# Patient Record
Sex: MALE | Race: WHITE | URBAN - METROPOLITAN AREA
[De-identification: names, ages, dates, MRNs, and addresses within clinical notes are randomized per-mention and may not be internally consistent; named-entity substitution may affect disease eponyms.]

---

## 2017-10-31 ENCOUNTER — OFFICE VISIT (OUTPATIENT)
Dept: INTERNAL MEDICINE CLINIC | Age: 22
End: 2017-10-31

## 2017-10-31 VITALS
WEIGHT: 178 LBS | SYSTOLIC BLOOD PRESSURE: 110 MMHG | HEIGHT: 71 IN | BODY MASS INDEX: 24.92 KG/M2 | DIASTOLIC BLOOD PRESSURE: 70 MMHG | OXYGEN SATURATION: 99 % | HEART RATE: 67 BPM

## 2017-10-31 DIAGNOSIS — Z00.00 ANNUAL PHYSICAL EXAM: Primary | ICD-10-CM

## 2017-10-31 DIAGNOSIS — G47.00 INSOMNIA, UNSPECIFIED TYPE: ICD-10-CM

## 2017-10-31 DIAGNOSIS — F32.A DEPRESSION, UNSPECIFIED DEPRESSION TYPE: ICD-10-CM

## 2017-10-31 PROCEDURE — G0444 DEPRESSION SCREEN ANNUAL: HCPCS | Performed by: INTERNAL MEDICINE

## 2017-10-31 PROCEDURE — 99385 PREV VISIT NEW AGE 18-39: CPT | Performed by: INTERNAL MEDICINE

## 2017-10-31 ASSESSMENT — PATIENT HEALTH QUESTIONNAIRE - PHQ9
3. TROUBLE FALLING OR STAYING ASLEEP: 3
5. POOR APPETITE OR OVEREATING: 3
4. FEELING TIRED OR HAVING LITTLE ENERGY: 3
1. LITTLE INTEREST OR PLEASURE IN DOING THINGS: 3
6. FEELING BAD ABOUT YOURSELF - OR THAT YOU ARE A FAILURE OR HAVE LET YOURSELF OR YOUR FAMILY DOWN: 0
2. FEELING DOWN, DEPRESSED OR HOPELESS: 3
SUM OF ALL RESPONSES TO PHQ9 QUESTIONS 1 & 2: 6
9. THOUGHTS THAT YOU WOULD BE BETTER OFF DEAD, OR OF HURTING YOURSELF: 1
10. IF YOU CHECKED OFF ANY PROBLEMS, HOW DIFFICULT HAVE THESE PROBLEMS MADE IT FOR YOU TO DO YOUR WORK, TAKE CARE OF THINGS AT HOME, OR GET ALONG WITH OTHER PEOPLE: 3
SUM OF ALL RESPONSES TO PHQ QUESTIONS 1-9: 22
8. MOVING OR SPEAKING SO SLOWLY THAT OTHER PEOPLE COULD HAVE NOTICED. OR THE OPPOSITE, BEING SO FIGETY OR RESTLESS THAT YOU HAVE BEEN MOVING AROUND A LOT MORE THAN USUAL: 3
7. TROUBLE CONCENTRATING ON THINGS, SUCH AS READING THE NEWSPAPER OR WATCHING TELEVISION: 3

## 2017-10-31 NOTE — PROGRESS NOTES
Date    CLAVICLE SURGERY Right     [facility]     Family History   Problem Relation Age of Onset    Other Father      Blood Clot    Asthma Sister      Social History     Social History    Marital status: Single     Spouse name: N/A    Number of children: N/A    Years of education: N/A     Occupational History    Not on file. Social History Main Topics    Smoking status: Never Smoker    Smokeless tobacco: Never Used    Alcohol use Yes      Comment: occassionally    Drug use: No    Sexual activity: Yes     Partners: Female     Birth control/ protection: Condom     Other Topics Concern    Not on file     Social History Narrative    No narrative on file       Review of Systems:  Constitutional: negative  HENT: negative  EYES: negative  Respiratory: negative  Cardiovasular :Negative  Gastrointestinal: negative  Endocrine: negative  Musculoskeletal: negative  Skin: negative  Allergic/Immunological: negative  Hematological: negative  Psychiatric/Behavorial:  Depression  : negative  Renal: negative  CNS: negative    Physical Exam: Blood pressure 110/70, pulse 67, height 5' 11\" (1.803 m), weight 178 lb (80.7 kg), SpO2 99 %. Vitals:    10/31/17 1420   BP: 110/70   Pulse: 67   SpO2: 99%   Weight: 178 lb (80.7 kg)   Height: 5' 11\" (1.803 m)     Body mass index is 24.83 kg/m². Constitutional: He is oriented to person, place, and time. He appears well-developed and well-nourished. No distress. HEENT:   Head: Normocephalic and atraumatic. Right Ear: Tympanic membrane, external ear and ear canal normal.   Left Ear: Tympanic membrane, external ear and ear canal normal.   Nose: Nose normal.   Mouth/Throat: Oropharynx is clear and moist and mucous membranes are normal. No oropharyngeal exudate or posterior oropharyngeal erythema. He has no cervical adenopathy. Eyes: Conjunctivae and extraocular motions are normal. Pupils are equal, round, and reactive to light.    Neck: Full passive range of motion without pain. Neck supple. No JVD present. Carotid bruit is not present. No mass and no thyromegaly present. Cardiovascular: Normal rate, regular rhythm, normal heart sounds and intact distal pulses. Exam reveals no gallop and no friction rub. PARI heard. Pulmonary/Chest: Effort normal and breath sounds normal. No respiratory distress. He has no wheezes, rhonchi or rales. Abdominal: Soft, non-tender. Bowel sounds and aorta are normal. There is no organomegaly, mass or bruit. Genitourinary:   Not examined. Musculoskeletal: Normal range of motion, no synovitis. He exhibits no edema. Neurological: He is alert and oriented to person, place, and time. He has normal reflexes. No cranial nerve deficit. Coordination normal.   Skin: Skin is warm, dry and intact. No suspicious lesions are noted. Psychiatric: He has a normal mood and affect. His speech is normal and behavior is normal. Judgment, cognition and memory are normal.     No results found for this or any previous visit. Assessment/Plan:  There is no problem list on file for this patient. Kristina Orrs was seen today for new patient. Diagnoses and all orders for this visit:    Annual physical exam  -     CBC; Future  -     Comprehensive Metabolic Panel; Future  -     Lipid Panel; Future  -     T4, Free; Future  -     TSH without Reflex; Future  -     Vitamin B12; Future  -     Folate; Future: Call for results    Depression, unspecified depression type          Referral to Kita Arroyo for cognitive behavioral therapy   Insomnia, unspecified type    Other orders  -     sertraline (ZOLOFT) 50 MG tablet; Take 1/2 pill a day for 7 days, then 1 pill a day. .Depression medicine. Return follow-up  Call for acute change in condition  Dr. Desmond Amin.

## 2017-11-17 ENCOUNTER — OFFICE VISIT (OUTPATIENT)
Dept: PSYCHOLOGY | Age: 22
End: 2017-11-17

## 2017-11-17 DIAGNOSIS — F33.1 MDD (MAJOR DEPRESSIVE DISORDER), RECURRENT EPISODE, MODERATE (HCC): Primary | ICD-10-CM

## 2017-11-17 PROCEDURE — 90791 PSYCH DIAGNOSTIC EVALUATION: CPT | Performed by: SOCIAL WORKER

## 2017-11-17 ASSESSMENT — PATIENT HEALTH QUESTIONNAIRE - PHQ9
3. TROUBLE FALLING OR STAYING ASLEEP: 3
7. TROUBLE CONCENTRATING ON THINGS, SUCH AS READING THE NEWSPAPER OR WATCHING TELEVISION: 3
6. FEELING BAD ABOUT YOURSELF - OR THAT YOU ARE A FAILURE OR HAVE LET YOURSELF OR YOUR FAMILY DOWN: 3
8. MOVING OR SPEAKING SO SLOWLY THAT OTHER PEOPLE COULD HAVE NOTICED. OR THE OPPOSITE, BEING SO FIGETY OR RESTLESS THAT YOU HAVE BEEN MOVING AROUND A LOT MORE THAN USUAL: 1
SUM OF ALL RESPONSES TO PHQ9 QUESTIONS 1 & 2: 5
4. FEELING TIRED OR HAVING LITTLE ENERGY: 3
2. FEELING DOWN, DEPRESSED OR HOPELESS: 2
10. IF YOU CHECKED OFF ANY PROBLEMS, HOW DIFFICULT HAVE THESE PROBLEMS MADE IT FOR YOU TO DO YOUR WORK, TAKE CARE OF THINGS AT HOME, OR GET ALONG WITH OTHER PEOPLE: 3
SUM OF ALL RESPONSES TO PHQ QUESTIONS 1-9: 21
5. POOR APPETITE OR OVEREATING: 3
1. LITTLE INTEREST OR PLEASURE IN DOING THINGS: 3
9. THOUGHTS THAT YOU WOULD BE BETTER OFF DEAD, OR OF HURTING YOURSELF: 0

## 2017-11-17 NOTE — PATIENT INSTRUCTIONS
1.  Start taking your zoloft in the morning. 2.  Try to minimize alcohol use  3. Let's set a goal of running 5 times week  4. Get back to kicking daily if you can   5. Review the handout on depression, we will build on this  6. Return to see Elisa Bobby in 2 weeks. Depression: Facts, Myths & Tips for Feeling Better        Facts    Depression is very common  Nearly 20% of the [place] population experiences a significant depression during their lifetime. Depression is treatable  Because depression affects so many, and can have such a powerful and negative impact on life, there has been an enormous amount of research conducted on how to reduce symptoms and improve functioning. As a result, we now know there are behaviors YOU can engage in to make yourself feel better. Depression is not a weakness  People suffer from depression for a variety of reasons, biological, environmental and behavioral.  Research indicates that [employer] is NOT one of the reasons people become depressed. Depression is not something you are powerless against  Evidence suggests that you can directly impact the intensity and duration of depression by what you do and by altering the way you think about certain things. The Downward Spiral    Depression often begins as a drop in mood due to an environmental or biological trigger that makes people feel less like being active. Being less active, in turn, often causes people to experience an even lower mood and feel even less like being active, and so the cycle begins. 1.     How can I start feeling better? The first and best way to reverse the downward cycle is to get active! Your body produces its own anti-depressants every time you exercise or do something pleasurable. Regular exercise is one of the very best ways to improve your mood.  In fact some studies have shown that a solid exercise program is as effective as psychotherapy or anti-depressant medication for some people. *See physical activity section of handout    Force yourself to do something you found pleasurable before depression. This may be different for everyone and it doesnt matter if its gardening, playing bridge, walking, reading a novel, or simply talking to a close friend. What matters is that YOU find the activity pleasurable! Even if you dont feel like doing something pleasurable for yourself, DO IT ANYWAY. We call this the fake it until you make it principle. Educate yourself! Often people feel powerless against medical conditions because they do not understand what is happening in their body. Just by reading this handout you know more than most people about depression. Knowledge is power when you can apply it, and make yourself feel better. *See recommended reading list at the bottom of this handout    Begin to notice unhealthy and unhelpful thoughts! In addition to how we behave, how we think influences our mood directly. Notice recurrent or alarming thoughts that have an impact on your mood. Ask yourself is this type of thinking helping me or hurting me?  if your answer is its hurting me here are some things you can do: *see disputation of negative thoughts section of handout  - Challenge the negative thought. Is it truly accurate? Wheres the proof? Become your own  and collect the facts.  - Reframe the negative thought. How can I think differently about this problem, situation, or view of myself? Allow yourself to view a situation from more than one angle, how might my spouse, friend, or someone I admire view this same problem?  - Use the best friend scenario. How would you help your best friend if he or she was having these same thoughts? Would you criticize him or her as harshly as you criticize yourself? Remember, YOU know YOU better than anyone else. You likely know what kinds of activities, thoughts and reinforcement you respond to.   Doing whats easiest and most doable is the key. Pick 1 or 2 things that are easy and get started feeling better TODAY! * Use the following handout sections to guide you through behavioral and thinking exercises to help you manage your depressive symptoms, improve your functioning and to begin living your life well again. Recommended readings on managing depression    - Feeling Good by Dr. Airam Childress. Burns     - Mind over [place]avia[place]Uday by Theresa Schmidt and [facility] [place] by Dr. Weir Chito by Dr. Quenton Schirmer. S[place]ky    Disputation:  Challenging Upsetting Thinking    Examine your thoughts for key words:  1. must, need, got to, have to, should (unrealistic standards)  2. never, always, completely, totally, all everything, everyone (predictions /  labeling)  3. awful, terrible, horrible, unbearable, disaster, worst ever (labeling / predictions)  4. jerk, slob, creep, hypocrite, bully, stupid (labels)  Dispute or question the accuracy of the questionable thoughts. 1. Am I upsetting myself unnecessarily? How can I see this another way? 2. Is my thinking working for or against me? How could I view this in a less upsetting way? 3. What am I demanding must happen? What do I want or prefer, rather than need? 4. Am I making something too terrible? Is it really that awful? What would be so terrible about that? 5. Am I labeling a person? What is the action that I dont like? 6. Whats untrue about my thoughts? How can I stick to the facts? Whats the proof for what I am thinking or believing about this? 7. Am I using extreme, black-and-white language? What less extreme words might be more accurate? 8. Am I fortune telling or mind reading in a way that gets me upset or unhappy? What are the odds (percent chance -- e.g., there is a 5% chance. ..) that it will really turn out the way Im thinking or imagining? 9. What are my options in this situation?   How would I like to respond? 10. Create more moderate, helpful, or realistic statements to replace the upsetting ones. 11. Have I had any experiences that show that this thought might not be totally true? 12. If my best friend or someone I loved had this thought, what would I tell them? 15. If my best friend or someone I loved knew I was thinking this thought, what would they say to me? What evidence would they point out to me that would suggest that my thought is not completely true? 14. Are there strengths in me or positives in the situation that I am ignoring? Am I underestimating my ability to cope with unfortunate circumstances? 15. When I am not feeling this way, do I think about this situation any differently? How?  16. Have I been in this type of situation before? What happened? What have I learned from prior experiences that could help me now? 17. Five years from now, if I look back on this situation, will I look at it any differently? Will I focus on any different part of my experience? 25. Am I blaming myself for something over which I do not have complete control? OVERCOMING DEPRESSION    This booklet is designed to provide information about strategies for overcoming depression. It discusses a model or framework for understanding depression (the depression spiral) and presents an overview of treatment of depression, including use of medication and strategic coping strategies. The booklet is designed to be used with the assistance of your Behavioral Health Consultant. The Depression Spiral    The figure below depicts one helpful way to think about and understand depression. Our life experience (including depression) is influenced by a number of interrelated factors: our environment, biological factors, our thoughts and beliefs, our behaviors, and our emotions. Each factor can affect the others. For example, Michi Eduardo recently began working in a fast-paced, high-pressure job (environmental factor).   She began to have thoughts such as Theres no way I can get all this work done. Its impossible. If I dont get it done, I may lose my job.   As a result, she began to work longer hours, cut out all fun activities, and withdraw from family and friends (behaviors). With this decrease in many of the positive, rewarding aspects of her life, she began to feel down, depressed, and more irritable (emotions). As the depression cycle started to take hold, she had more difficulty sleeping and concentrating (biology), which led to even more irritability and depression (emotions) and further withdrawal from activities and people (behaviors). At some point in the cycle, the balance of chemicals in her brain also began to alter (biology), which further deepened the spiral of depression. BREAKING THE DEPRESSION SPIRAL      As you can see, a variety of factors, including thoughts, behaviors, emotions, and environmental and biological factors can cause, maintain, and worsen depression. Fortunately, there are effective ways of breaking the spiral of depression. Since all the factors are interrelated (with one aspect affecting the others), even making small changes in just one or two areas can gradually lead to significant improvements in other areas. For example, Giovani Goyal noticed her worsening moods and decided to take action to break the depression spiral.  She focused first on one area that she felt would be easy to change:  her behaviors. Specifically, she wanted to make changes in how she spent her time in the evenings after work. She made a goal of spending 30 minutes each evening relaxing and doing fun activities with her family (behaviors). After several weeks, she noticed that she was beginning to feel lower levels of stress and her sleep began to improve (biological factor).   Feeling more rested and better able to concentrate (biological factors) increased her belief that she could effectively manage the demands of

## 2017-11-29 ENCOUNTER — HOSPITAL ENCOUNTER (OUTPATIENT)
Dept: OTHER | Age: 22
Discharge: HOME | End: 2017-11-29
Attending: INTERNAL MEDICINE | Admitting: INTERNAL MEDICINE

## 2017-11-29 DIAGNOSIS — Z00.00 ANNUAL PHYSICAL EXAM: ICD-10-CM

## 2017-11-29 LAB
A/G RATIO: 2.2 (ref 1.1–2.2)
ALBUMIN SERPL-MCNC: 4.9 G/DL (ref 3.4–5)
ALP BLD-CCNC: 50 U/L (ref 40–129)
ALT SERPL-CCNC: 12 U/L (ref 10–40)
ANION GAP SERPL CALCULATED.3IONS-SCNC: 12 MMOL/L (ref 3–16)
AST SERPL-CCNC: 16 U/L (ref 15–37)
BILIRUB SERPL-MCNC: 0.5 MG/DL (ref 0–1)
BUN BLDV-MCNC: 12 MG/DL (ref 7–20)
CALCIUM SERPL-MCNC: 10.1 MG/DL (ref 8.3–10.6)
CHLORIDE BLD-SCNC: 103 MMOL/L (ref 99–110)
CHOLESTEROL, TOTAL: 132 MG/DL (ref 0–199)
CO2: 28 MMOL/L (ref 21–32)
CREAT SERPL-MCNC: 0.9 MG/DL (ref 0.9–1.3)
FOLATE: 19.51 NG/ML (ref 4.78–24.2)
GFR AFRICAN AMERICAN: >60
GFR NON-AFRICAN AMERICAN: >60
GLOBULIN: 2.2 G/DL
GLUCOSE BLD-MCNC: 87 MG/DL (ref 70–99)
HCT VFR BLD CALC: 44 % (ref 40.5–52.5)
HDLC SERPL-MCNC: 69 MG/DL (ref 40–60)
HEMOGLOBIN: 14.7 G/DL (ref 13.5–17.5)
LDL CHOLESTEROL CALCULATED: 52 MG/DL
MCH RBC QN AUTO: 31.5 PG (ref 26–34)
MCHC RBC AUTO-ENTMCNC: 33.4 G/DL (ref 31–36)
MCV RBC AUTO: 94.2 FL (ref 80–100)
PDW BLD-RTO: 13.3 % (ref 12.4–15.4)
PLATELET # BLD: 192 K/UL (ref 135–450)
PMV BLD AUTO: 7.8 FL (ref 5–10.5)
POTASSIUM SERPL-SCNC: 5.1 MMOL/L (ref 3.5–5.1)
RBC # BLD: 4.67 M/UL (ref 4.2–5.9)
SODIUM BLD-SCNC: 143 MMOL/L (ref 136–145)
T4 FREE: 1.4 NG/DL (ref 0.9–1.8)
TOTAL PROTEIN: 7.1 G/DL (ref 6.4–8.2)
TRIGL SERPL-MCNC: 57 MG/DL (ref 0–150)
TSH SERPL DL<=0.05 MIU/L-ACNC: 0.76 UIU/ML (ref 0.27–4.2)
VITAMIN B-12: 459 PG/ML (ref 211–911)
VLDLC SERPL CALC-MCNC: 11 MG/DL
WBC # BLD: 4.9 K/UL (ref 4–11)

## 2017-11-30 ENCOUNTER — OFFICE VISIT (OUTPATIENT)
Dept: INTERNAL MEDICINE CLINIC | Age: 22
End: 2017-11-30

## 2017-11-30 VITALS
BODY MASS INDEX: 24.27 KG/M2 | OXYGEN SATURATION: 99 % | SYSTOLIC BLOOD PRESSURE: 122 MMHG | DIASTOLIC BLOOD PRESSURE: 74 MMHG | HEART RATE: 62 BPM | WEIGHT: 174 LBS

## 2017-11-30 DIAGNOSIS — F32.A DEPRESSION, UNSPECIFIED DEPRESSION TYPE: ICD-10-CM

## 2017-11-30 DIAGNOSIS — G47.00 INSOMNIA, UNSPECIFIED TYPE: ICD-10-CM

## 2017-11-30 PROCEDURE — 99213 OFFICE O/P EST LOW 20 MIN: CPT | Performed by: INTERNAL MEDICINE

## 2017-11-30 RX ORDER — SERTRALINE HYDROCHLORIDE 100 MG/1
TABLET, FILM COATED ORAL
Qty: 30 TABLET | Refills: 5 | Status: SHIPPED | OUTPATIENT
Start: 2017-11-30

## 2017-11-30 RX ORDER — TRAZODONE HYDROCHLORIDE 50 MG/1
TABLET ORAL
Qty: 30 TABLET | Refills: 5 | Status: SHIPPED | OUTPATIENT
Start: 2017-11-30

## 2017-11-30 NOTE — PROGRESS NOTES
Subjective:      Patient ID: Param Ochoa is a 24 y.o. male. Cc: Depression  HPI: Reviewed last office visit 10/31/2017 at which time patient will [facility] of depression and insomnia. Start on Zoloft now taking 50 mg once a day still notes difficulty with depression. Concerning insomnia uses melatonin which is helpful some of the time. 11/17/2017 seen for CBT: Alisia: DX: Major depressive disorder. Patient states still problem with decrease in motivation, decreased energy, decrease in focus, hard to get out of the bed. Medicines and allergies reviewed  Health maintenance reviewed  Reviewed laboratory data 11/29/2017: Chemistry panel: Unremarkable. CBC: Normal.  Thyroid: Normal.  Folate and B12: Normal.  Lipid panel: Total cholesterol: 132. LDL cholesterol: 52.    Review of Systems    Review of Systems   Constitutional: negative   HENT: negative   EYES: negative   Respiratory: negative   Gastrointestinal: negative   Endocrine: negative   Musculoskeletal: negative   Skin: negative   Allergic/Immunological: negative   Hematological: negative   Psychiatric/Behavorial:  Depression. Insomnia. CV: negative   CNS: negative   :Negative   S/E:Negative  Renal: Negative      Objective:   Physical Exam  ;  Lungs: Clear to auscultation. CV: S1-S2 normal.  PARI. Carotid: No bruit. Head/neck: Neck: No lymphadenopathy. Thyroid: Not palpable. Eyes: EOMI, PERRLA without nystagnus. Spine/extremities: No ankle edema. Skin: No rash. CNS:Patient's alert, cooperative, moves all 4 limbs, ambulates without difficulty, no slurred speech, no facial droop, good orientation. Good historian. Blood pressure 122/74, pulse 62, weight 174 lb (78.9 kg), SpO2 99 %. Assessment:     1. Depression: Mild improvement with Zoloft  2. Insomnia: Minimal improvement with melatonin         Plan:     1. Increase Zoloft 100 mg take 1 a day. 2.  Trial of trazodone 50 mg initially 1/2 increased take 1 before bedtime for insomnia  3.

## 2017-12-01 ENCOUNTER — OFFICE VISIT (OUTPATIENT)
Dept: PSYCHOLOGY | Age: 22
End: 2017-12-01

## 2017-12-01 DIAGNOSIS — F33.1 MDD (MAJOR DEPRESSIVE DISORDER), RECURRENT EPISODE, MODERATE (HCC): Primary | ICD-10-CM

## 2017-12-01 PROCEDURE — 90832 PSYTX W PT 30 MINUTES: CPT | Performed by: SOCIAL WORKER

## 2017-12-01 ASSESSMENT — PATIENT HEALTH QUESTIONNAIRE - PHQ9
6. FEELING BAD ABOUT YOURSELF - OR THAT YOU ARE A FAILURE OR HAVE LET YOURSELF OR YOUR FAMILY DOWN: 1
10. IF YOU CHECKED OFF ANY PROBLEMS, HOW DIFFICULT HAVE THESE PROBLEMS MADE IT FOR YOU TO DO YOUR WORK, TAKE CARE OF THINGS AT HOME, OR GET ALONG WITH OTHER PEOPLE: 1
1. LITTLE INTEREST OR PLEASURE IN DOING THINGS: 2
2. FEELING DOWN, DEPRESSED OR HOPELESS: 1
7. TROUBLE CONCENTRATING ON THINGS, SUCH AS READING THE NEWSPAPER OR WATCHING TELEVISION: 3
SUM OF ALL RESPONSES TO PHQ9 QUESTIONS 1 & 2: 3
3. TROUBLE FALLING OR STAYING ASLEEP: 3
4. FEELING TIRED OR HAVING LITTLE ENERGY: 3
9. THOUGHTS THAT YOU WOULD BE BETTER OFF DEAD, OR OF HURTING YOURSELF: 0
8. MOVING OR SPEAKING SO SLOWLY THAT OTHER PEOPLE COULD HAVE NOTICED. OR THE OPPOSITE, BEING SO FIGETY OR RESTLESS THAT YOU HAVE BEEN MOVING AROUND A LOT MORE THAN USUAL: 1
SUM OF ALL RESPONSES TO PHQ QUESTIONS 1-9: 16
5. POOR APPETITE OR OVEREATING: 2

## 2017-12-01 NOTE — PATIENT INSTRUCTIONS
1.  Review the handout on insomnia in the behavior change   2. Start paying attention to what happened in your day on nights you cannot sleep  3. Re-review the handout on depression  4. Keep up the exercise  5. Next time we meet we can work on some breathing techniques  6. Return to see Kita Arroyo in 2 weeks. Insomnia          Results of Insomnia What Leads to Insomnia What maintains insomnia? - Physiological arousal  - Worrisome thinking  - Anxiety  - Depression  - Family conflict  - Work problems  - Loss of motivation - Acute stress  - Personal loss (death, separation, divorce, etc)  - Medical problems  - Work problems  - Family problems  - Irregular sleep schedule - Inaccurate thoughts about sleep  - Sleeping pills  - Myths about duration of sleep  - Daytime napping  - Excess time in bed  - Performance anxiety  - Medications for health problems         How can I improve my sleep? Change your sleep behavior  Go To Bed Only When You Are Sleepy  There is no reason to go to bed if you are not sleepy. When you go to bed too early, it only gives you more time to become frustrated. Individuals often ponder the events of the day, plan the next days schedule, or worry about their inability to fall to sleep. These behaviors are incompatible with sleep, and tend to perpetuate insomnia. You should therefore delay your bedtime until you are sleepy. This may mean that you go to bed later than your scheduled bedtime. However, stick to your scheduled rising time regardless of the time you go to bed. Get Out of Bed when You Cant Fall Asleep or Cannot Go Back to Sleep in 13 Min    When you recognize that youve become a clockwatcher, get out of bed. If you wake up during your sleep and youve tried falling back to sleep for 15 minutes and cant, get out of bed. Remember, the goal is to fall to sleep quickly.   Return to bed only when you are sleepy (i.e., yawning, head bobbing, eyes closing, concentration decreasing). The goal is for you to reconnect your bed with sleeping rather than frustration. You will have to repeat this step as often as necessary. Use Your Bed or Bedroom for Sleep and Sex Only  The purpose of this guideline is to associate your bedroom with sleep rather than wakefulness. Just as you may associate the kitchen with hunger, this guideline will help you associate sleep and pleasure with your bedroom. Follow this rule both during the day and at night. DO NOT watch TV, listen to the radio, eat or read in bed. You may have to temporarily move the t.v. or radio from your bedroom to help you regain a stable sleep cycle. Sleep Hygiene Guidelines to Improve your Sleep Behavior  1. NO CAFFEINE:  No caffeine 6-8 hours before bedtime  Yep, its true caffeine disturbs sleep; even for people who do not think they experience a stimulation effect. Individuals with insomnia are often more sensitive to mild stimulants than normal sleepers. Caffeine is found in items such as coffee, tea, soda, chocolate, and many over-the-counter medications (e.g., Excedrin). 2. AVOID NICOTINE:  Avoid nicotine before bedtime  Nicotine is a stimulant. It is a myth that smoking helps you relax.   As nicotine builds in the system it produces an effect similar to caffeine. DO NOT smoke to get yourself back to sleep. 3. AVOID ALCOHOL:  Avoid alcohol after dinner  Alcohol often promotes the onset of sleep, but as alcohol is metabolized sleep becomes disturbed and fragmented. Thus, a large amount of alcohol is a poor sleep aid and should not be used as such. Limit alcohol use to small quantities to moderate quantities. 4. NO SLEEPING PILLS:  Sleep medications are effective only temporarily  Scientists have shown that sleep medications lose their effectiveness in about 2 - 4 weeks when taken regularly. Over time, sleeping pills actually make sleep problems worse.   When sleeping pills have been used for a long period, withdrawal from the medication can lead to an insomnia rebound. Thus, after long-term use, many individuals incorrectly conclude that they need sleeping pills in order to sleep normally. 5. REGULAR EXERCISE: Preferably 40 minutes each day  Exercise in the late afternoon or early evening can aid sleep, although the positive effect often takes several weeks to become noticeable. Do not exercise within 2 hours of bedtime because it may elevate your nervous system activity and interfere with falling asleep. 6. BEDROOM ENVIRONMENT: Moderate temperature, quiet, dark and comfortable  Extremes of heat or cold can disrupt sleep. Noises can be masked with background white noise (such as the noise of a fan) or with earplugs. Bedrooms may be darkened with black-out shades or sleep masks can be worn. Position clocks out-of-sight since clock-watching can increase worry about the effects of lack of sleep. Be sure your mattress is not too soft or too firm and that your pillow is the right height and firmness. 7. EATING  You should avoid the following foods at bedtime:  anything caffeinated like chocolate, peanuts, beans, most raw fruits and vegetables (they may cause gas), and high-fat foods such as potato chips or corn chips. Be especially careful to avoid heavy meals and spices in the evening. Do not go to bed too hungry or too full. Avoid snacks in the middle of the night because awakening may become associated with hunger. A light bedtime snack, such a glass of warm milk, cheese, or a bowl of cereal can promote sleep. 8. AVOID NAPS  The sleep you obtain during the day takes away from your sleep needed at night resulting in lighter, more restless sleep, difficulty falling asleep or early morning awakening. If you must nap, keep it brief, and try to schedule it before 3:00 pm.  It is best to set an alarm to ensure you dont sleep more than 15-30 minutes.   9. UNWIND  Allow yourself at least an hour before

## 2017-12-01 NOTE — PROGRESS NOTES
exercise), Motivational Interviewing to increase patient confidence and compliance with adhering to behavioral change plan, Motivational Interviewing to determine importance and readiness for change and Supportive techniques      Pt Behavioral Change Plan:  1. Review the handout on insomnia in the behavior change   2. Start paying attention to what happened in your day on nights you cannot sleep  3. Re-review the handout on depression  4. Keep up the exercise  5. Next time we meet we can work on some breathing techniques  6. Return to see Yanet Haddad in 2 weeks.

## 2017-12-29 ENCOUNTER — TELEPHONE (OUTPATIENT)
Dept: INTERNAL MEDICINE CLINIC | Age: 22
End: 2017-12-29

## 2018-01-19 ENCOUNTER — OFFICE VISIT (OUTPATIENT)
Dept: PSYCHOLOGY | Age: 23
End: 2018-01-19

## 2018-01-19 ENCOUNTER — OFFICE VISIT (OUTPATIENT)
Dept: INTERNAL MEDICINE CLINIC | Age: 23
End: 2018-01-19

## 2018-01-19 VITALS
BODY MASS INDEX: 23.85 KG/M2 | SYSTOLIC BLOOD PRESSURE: 118 MMHG | HEART RATE: 53 BPM | OXYGEN SATURATION: 99 % | DIASTOLIC BLOOD PRESSURE: 68 MMHG | WEIGHT: 171 LBS

## 2018-01-19 DIAGNOSIS — G47.00 INSOMNIA, UNSPECIFIED TYPE: ICD-10-CM

## 2018-01-19 DIAGNOSIS — F41.9 ANXIETY: Primary | ICD-10-CM

## 2018-01-19 DIAGNOSIS — F32.A DEPRESSION, UNSPECIFIED DEPRESSION TYPE: Primary | ICD-10-CM

## 2018-01-19 PROCEDURE — 99214 OFFICE O/P EST MOD 30 MIN: CPT | Performed by: INTERNAL MEDICINE

## 2018-01-19 PROCEDURE — 90832 PSYTX W PT 30 MINUTES: CPT | Performed by: SOCIAL WORKER

## 2018-01-19 ASSESSMENT — PATIENT HEALTH QUESTIONNAIRE - PHQ9
10. IF YOU CHECKED OFF ANY PROBLEMS, HOW DIFFICULT HAVE THESE PROBLEMS MADE IT FOR YOU TO DO YOUR WORK, TAKE CARE OF THINGS AT HOME, OR GET ALONG WITH OTHER PEOPLE: 2
5. POOR APPETITE OR OVEREATING: 3
7. TROUBLE CONCENTRATING ON THINGS, SUCH AS READING THE NEWSPAPER OR WATCHING TELEVISION: 3
2. FEELING DOWN, DEPRESSED OR HOPELESS: 0
4. FEELING TIRED OR HAVING LITTLE ENERGY: 2
1. LITTLE INTEREST OR PLEASURE IN DOING THINGS: 2
SUM OF ALL RESPONSES TO PHQ9 QUESTIONS 1 & 2: 2
3. TROUBLE FALLING OR STAYING ASLEEP: 2
SUM OF ALL RESPONSES TO PHQ QUESTIONS 1-9: 14
9. THOUGHTS THAT YOU WOULD BE BETTER OFF DEAD, OR OF HURTING YOURSELF: 0
6. FEELING BAD ABOUT YOURSELF - OR THAT YOU ARE A FAILURE OR HAVE LET YOURSELF OR YOUR FAMILY DOWN: 1
8. MOVING OR SPEAKING SO SLOWLY THAT OTHER PEOPLE COULD HAVE NOTICED. OR THE OPPOSITE, BEING SO FIGETY OR RESTLESS THAT YOU HAVE BEEN MOVING AROUND A LOT MORE THAN USUAL: 1

## 2018-01-19 NOTE — PATIENT INSTRUCTIONS
1.  Utilize your planner to schedule study times and get your assignments in the planner  2. Shoot for no caffeine after 3pm  3. Keep up the exercise  4. See if not not procrastinating helps with your anxiety  5. Return to see David Walton in 8 weeks.

## 2018-01-19 NOTE — PROGRESS NOTES
Subjective:      Patient ID: Corrinne Fish is a 25 y.o. male. Cc: Depression  HPI: Patient here in follow-up to history of depression and insomnia. Did reviewed last office visit 11/30/2017 at which time increased Zoloft now taking 100 mg once a day. Patient did try trazodone only a few times complained of \"grogginess\" the next morning and stop the medicine. I did discuss with him earlier timing for the trazodone in the lower dose may be helpful. He states sleep overall is doing fairly well without the medicine. He does relate his depression has improved: Rate on our scale from 0-10 with 0 being depressed and 10 doing well patient  rates himself before starting Zoloft  4 - 5/10. Patient now rates himself  7 - 8/10. Patient has seen Wilma Medley for cognitive behavioral therapy. States he needs to work more on his motivation which is low. Does relate he is on a better sleep schedule. States \"snacks all day\". We discussed need for better meal schedule and better selection of snacks. Medicines and allergies reviewed  Health maintenance reviewed    Review of Systems    Review of Systems   Constitutional: negative   HENT: negative   EYES: negative   Respiratory: negative   Gastrointestinal: negative   Endocrine: negative   Musculoskeletal: negative   Skin: negative   Allergic/Immunological: negative   Hematological: negative   Psychiatric/Behavorial: Depression and insomnia as above. CV: negative   CNS: negative   :Negative   S/E:Negative  Renal: Negative      Objective:   Physical Exam: Lungs: Clear to auscultation. CV: S1-S2 normal.  PARI. Carotid: No bruit. Head/neck: Neck: No lymphadenopathy. Thyroid: Not palpable. Eyes: EOMI, PERRLA without nystagnus. Spine/extremities: No ankle edema. Skin: No rash. CNS:Patient's alert, cooperative, moves all 4 limbs, ambulates without difficulty, no slurred speech, no facial droop, good orientation. Good historian.   Blood pressure 118/68, pulse 53, weight 171 lb (77.6 kg), SpO2 99 %. Assessment:      1. Depression: Has improved  2. Insomnia has improved some intolerance to trazodone  3. Decreased motivation persisting  4. Food selection: Relates much snacking. Plan:      1. We agree to continue on present dose of medicine. 2.  Follow-up with Rose Clinton concerning his motivation  3. Return follow-up  I spent greater than 25 minutes with this patient face-to-face of which greater than 50% involved counseling and care coordination as mentioned above  Dr. Tania Peraza.

## 2018-03-09 ENCOUNTER — TELEPHONE (OUTPATIENT)
Dept: INTERNAL MEDICINE CLINIC | Age: 23
End: 2018-03-09